# Patient Record
Sex: MALE | Race: WHITE | NOT HISPANIC OR LATINO
[De-identification: names, ages, dates, MRNs, and addresses within clinical notes are randomized per-mention and may not be internally consistent; named-entity substitution may affect disease eponyms.]

---

## 2017-08-17 PROBLEM — Z00.00 ENCOUNTER FOR PREVENTIVE HEALTH EXAMINATION: Status: ACTIVE | Noted: 2017-08-17

## 2017-10-12 ENCOUNTER — APPOINTMENT (OUTPATIENT)
Dept: ENDOCRINOLOGY | Facility: CLINIC | Age: 82
End: 2017-10-12
Payer: MEDICARE

## 2017-10-12 VITALS
WEIGHT: 157.56 LBS | BODY MASS INDEX: 27.92 KG/M2 | SYSTOLIC BLOOD PRESSURE: 145 MMHG | DIASTOLIC BLOOD PRESSURE: 71 MMHG | HEIGHT: 63 IN | OXYGEN SATURATION: 98 % | TEMPERATURE: 98.1 F | HEART RATE: 75 BPM

## 2017-10-12 DIAGNOSIS — G40.909 EPILEPSY, UNSPECIFIED, NOT INTRACTABLE, W/OUT STATUS EPILEPTICUS: ICD-10-CM

## 2017-10-12 DIAGNOSIS — G89.29 OTHER CHRONIC PAIN: ICD-10-CM

## 2017-10-12 DIAGNOSIS — Z86.79 PERSONAL HISTORY OF OTHER DISEASES OF THE CIRCULATORY SYSTEM: ICD-10-CM

## 2017-10-12 DIAGNOSIS — N40.0 BENIGN PROSTATIC HYPERPLASIA WITHOUT LOWER URINARY TRACT SYMPMS: ICD-10-CM

## 2017-10-12 DIAGNOSIS — E55.9 VITAMIN D DEFICIENCY, UNSPECIFIED: ICD-10-CM

## 2017-10-12 DIAGNOSIS — Z87.81 PERSONAL HISTORY OF (HEALED) TRAUMATIC FRACTURE: ICD-10-CM

## 2017-10-12 DIAGNOSIS — K21.0 GASTRO-ESOPHAGEAL REFLUX DISEASE WITH ESOPHAGITIS: ICD-10-CM

## 2017-10-12 DIAGNOSIS — R13.10 DYSPHAGIA, UNSPECIFIED: ICD-10-CM

## 2017-10-12 PROCEDURE — 99214 OFFICE O/P EST MOD 30 MIN: CPT

## 2017-10-26 ENCOUNTER — MEDICATION RENEWAL (OUTPATIENT)
Age: 82
End: 2017-10-26

## 2017-11-07 ENCOUNTER — FORM ENCOUNTER (OUTPATIENT)
Age: 82
End: 2017-11-07

## 2017-11-08 ENCOUNTER — APPOINTMENT (OUTPATIENT)
Dept: ULTRASOUND IMAGING | Facility: CLINIC | Age: 82
End: 2017-11-08
Payer: MEDICARE

## 2017-11-08 ENCOUNTER — OUTPATIENT (OUTPATIENT)
Dept: OUTPATIENT SERVICES | Facility: HOSPITAL | Age: 82
LOS: 1 days | End: 2017-11-08

## 2017-11-08 PROCEDURE — 76536 US EXAM OF HEAD AND NECK: CPT | Mod: 26

## 2018-02-12 ENCOUNTER — APPOINTMENT (OUTPATIENT)
Dept: ENDOCRINOLOGY | Facility: CLINIC | Age: 83
End: 2018-02-12

## 2018-03-11 PROBLEM — G40.909 SEIZURE DISORDER: Status: ACTIVE | Noted: 2018-03-11

## 2018-03-11 PROBLEM — Z86.79 HISTORY OF SUBDURAL HEMATOMA: Status: RESOLVED | Noted: 2018-03-11 | Resolved: 2018-03-11

## 2018-03-11 PROBLEM — N40.0 BPH (BENIGN PROSTATIC HYPERPLASIA): Status: ACTIVE | Noted: 2018-03-11

## 2018-03-11 PROBLEM — G89.29 CHRONIC PAIN: Status: ACTIVE | Noted: 2018-03-11

## 2018-03-11 PROBLEM — K21.0 GASTROESOPHAGEAL REFLUX DISEASE WITH ESOPHAGITIS: Status: ACTIVE | Noted: 2018-03-11

## 2018-03-11 PROBLEM — R13.10 DYSPHAGIA: Status: ACTIVE | Noted: 2018-03-11

## 2018-03-11 PROBLEM — E55.9 VITAMIN D DEFICIENCY: Status: ACTIVE | Noted: 2018-03-11

## 2018-03-11 PROBLEM — Z87.81 HISTORY OF FRACTURE OF SKULL: Status: RESOLVED | Noted: 2018-03-11 | Resolved: 2018-03-11

## 2018-03-11 RX ORDER — POLYETHYLENE GLYCOL 3350 17 G/17G
17 POWDER, FOR SOLUTION ORAL DAILY
Refills: 0 | Status: ACTIVE | COMMUNITY
Start: 2018-03-11

## 2018-03-11 RX ORDER — PANTOPRAZOLE 40 MG/1
40 TABLET, DELAYED RELEASE ORAL DAILY
Refills: 0 | Status: ACTIVE | COMMUNITY
Start: 2018-03-11

## 2018-03-11 RX ORDER — ASPIRIN 81 MG/1
81 TABLET ORAL
Refills: 0 | Status: ACTIVE | COMMUNITY
Start: 2018-03-11

## 2018-03-11 RX ORDER — FENTANYL 25 UG/H
25 PATCH, EXTENDED RELEASE TRANSDERMAL
Refills: 0 | Status: ACTIVE | COMMUNITY
Start: 2018-03-11

## 2018-03-11 RX ORDER — SILODOSIN 8 MG/1
8 CAPSULE ORAL DAILY
Refills: 0 | Status: ACTIVE | COMMUNITY
Start: 2018-03-11

## 2018-03-11 RX ORDER — GUARN/MA-HUANG/P.GIN/S.GINSENG
600-200 TABLET ORAL
Refills: 0 | Status: ACTIVE | COMMUNITY
Start: 2018-03-11

## 2018-03-11 RX ORDER — CARBAMAZEPINE 400 MG/1
400 TABLET, EXTENDED RELEASE ORAL
Refills: 0 | Status: ACTIVE | COMMUNITY
Start: 2018-03-11

## 2018-03-12 ENCOUNTER — LABORATORY RESULT (OUTPATIENT)
Age: 83
End: 2018-03-12

## 2018-03-12 ENCOUNTER — APPOINTMENT (OUTPATIENT)
Dept: ENDOCRINOLOGY | Facility: CLINIC | Age: 83
End: 2018-03-12
Payer: MEDICARE

## 2018-03-12 VITALS
WEIGHT: 163 LBS | DIASTOLIC BLOOD PRESSURE: 64 MMHG | BODY MASS INDEX: 27.83 KG/M2 | OXYGEN SATURATION: 99 % | TEMPERATURE: 97.4 F | SYSTOLIC BLOOD PRESSURE: 171 MMHG | HEART RATE: 79 BPM | HEIGHT: 64 IN

## 2018-03-12 DIAGNOSIS — Z82.0 FAMILY HISTORY OF EPILEPSY AND OTHER DISEASES OF THE NERVOUS SYSTEM: ICD-10-CM

## 2018-03-12 PROCEDURE — 99214 OFFICE O/P EST MOD 30 MIN: CPT

## 2018-03-12 RX ORDER — METFORMIN HYDROCHLORIDE 500 MG/1
500 TABLET, COATED ORAL TWICE DAILY
Qty: 60 | Refills: 11 | Status: ACTIVE | COMMUNITY
Start: 2018-03-12

## 2018-03-15 LAB
THYROGLOBULIN TUMOR MARKER, IA INTERP: NORMAL
THYROGLOBULIN TUMOR MARKER, IA: <0.1 NG/ML

## 2018-03-22 ENCOUNTER — MEDICATION RENEWAL (OUTPATIENT)
Age: 83
End: 2018-03-22

## 2018-09-12 ENCOUNTER — APPOINTMENT (OUTPATIENT)
Dept: ENDOCRINOLOGY | Facility: CLINIC | Age: 83
End: 2018-09-12
Payer: MEDICARE

## 2018-09-12 VITALS
HEIGHT: 64 IN | BODY MASS INDEX: 26.8 KG/M2 | SYSTOLIC BLOOD PRESSURE: 147 MMHG | OXYGEN SATURATION: 95 % | DIASTOLIC BLOOD PRESSURE: 73 MMHG | WEIGHT: 157 LBS | TEMPERATURE: 97.9 F | HEART RATE: 68 BPM

## 2018-09-12 DIAGNOSIS — M19.011 PRIMARY OSTEOARTHRITIS, RIGHT SHOULDER: ICD-10-CM

## 2018-09-12 DIAGNOSIS — M19.012 PRIMARY OSTEOARTHRITIS, RIGHT SHOULDER: ICD-10-CM

## 2018-09-12 PROCEDURE — 99214 OFFICE O/P EST MOD 30 MIN: CPT

## 2018-09-12 RX ORDER — ATORVASTATIN CALCIUM 10 MG/1
10 TABLET, FILM COATED ORAL
Qty: 90 | Refills: 3 | Status: DISCONTINUED | COMMUNITY
Start: 2018-03-11 | End: 2018-09-12

## 2018-09-12 RX ORDER — UBIDECARENONE/VIT E ACET 100MG-5
50 MCG CAPSULE ORAL
Refills: 0 | Status: ACTIVE | COMMUNITY
Start: 2018-03-11

## 2018-09-12 RX ORDER — CHLORHEXIDINE GLUCONATE 4 %
1000 LIQUID (ML) TOPICAL
Refills: 0 | Status: ACTIVE | COMMUNITY
Start: 2018-03-11

## 2019-03-13 ENCOUNTER — APPOINTMENT (OUTPATIENT)
Dept: ENDOCRINOLOGY | Facility: CLINIC | Age: 84
End: 2019-03-13
Payer: MEDICARE

## 2019-03-13 VITALS
DIASTOLIC BLOOD PRESSURE: 85 MMHG | HEART RATE: 74 BPM | HEIGHT: 64 IN | SYSTOLIC BLOOD PRESSURE: 164 MMHG | WEIGHT: 165 LBS | OXYGEN SATURATION: 95 % | BODY MASS INDEX: 28.17 KG/M2

## 2019-03-13 PROCEDURE — 99214 OFFICE O/P EST MOD 30 MIN: CPT

## 2019-03-17 NOTE — ASSESSMENT
[FreeTextEntry1] : 1) Type 2 DM:  Glycemic control is excellent by A1c level, and his diet leaves little room for additional modification.  He is not currently monitoring fingerstick blood glucoses, and will not push him to do this until/unless his A1c rises to near 6.5%.\par --Continue metformin 500 mg BID\par \par 2) Hypothyroidism:  TSH level is at the lower limits of the normal range, which is his goal at this point.\par --Continue levothyroxine (125 mcg tablets) 2 pills 5 days/week, 1 pill 2 days/week\par \par 3) Hypercholesterolemia:  LDL-cholesterol is well below his target of 100 mg%.\par --Continue atorvastatin 20 mg/day\par \par 4) Follicular thyroid CA:  Thyroglobulin level remains undetectable.  HIs last ultrasound in 2017 was negative for recurrent disease or pathologic adenopathy.\par --Repeat thyroglobulin and ultrasound prior to his next visit.\par \par 5) Vitamin D deficiency: 25-D level is actually somewhat above target range.\par --Decrease vitamin D supplementation to 2000 units 5 days/week\par \par To see his ophthalmologist Dr. Chou later today to evaluate the conjunctival injection in his R eye.\par \par See for follow-up in 6 months.  CMP, lipids, A1c, TFTs, thyroglobulin level, 25-D (and ultrasound) prior to his next visit [FreeTextEntry2] : Diet

## 2019-03-17 NOTE — HISTORY OF PRESENT ILLNESS
[FreeTextEntry1] : 84-year-old man who is followed for follicular thyroid CA, post-surgical hypothyroidism, type 2 DM and hyperlipidemia.  He is s/p total thyroidectomy and I-131 ablation (150 mCi) in 2010 for a 3.5 cm lesion in the R lobe which showed capsular and perineural invasion on pathology.  There was no local extension or lymph node involvement.  HIs thyroglobulin levels have remained undetectable since treatment, including after Thyrogen stimulation in 2015.  (His total body scan was also negative at that time).  His other medical problems include chronic dysphagia (which dates to the time of his surgery) and a previous skull fracture with a subdural hematoma which required craniotomy/evacuation/repair in 1989.\par \par He now returns after a hiatus of 6 months.  Has not had any significant medical issues in the interim, and has no new physical complaints.\par As usual, came to the visit accompanied by his wife\sarai Was in [place] for 3 weeks in February.\par Medications reviewed--no changes since his last visit.  Taking levothyroxine consistently and at least an hour before breakfast.\par Vitamin D is still 2000 units/day, but he is taking it 7days (not 5 days)/week.\par Diet reviewed:\par --Breakfast is one or two slices of bread with one piece of fruit\par --Lunch is a piece of Italian bread with cheese or Italian peppers\par --Protein at supper is lamb once or twice or week, steak once a week, veal chop once a week and fish once a week.  Starch is usually pasta--"not too much."\par --Intake of baked goods is minimal.  Has ice cream once a week.

## 2019-03-17 NOTE — REVIEW OF SYSTEMS
[Hearing Loss] : hearing loss [Hair Loss] : hair loss [Difficulty Walking] : difficulty walking [Cold Intolerance] : cold intolerant [Fatigue] : no fatigue [Decreased Appetite] : appetite not decreased [Fever] : no fever [Chills] : no chills [Dry Eyes] : no dryness of the eyes [Eyes Itch] : no itching of the eyes [Chest Pain] : no chest pain [Palpitations] : no palpitations [Lower Ext Edema] : no lower extremity edema [Shortness Of Breath] : no shortness of breath [Wheezing] : no wheezing was heard [Nausea] : no nausea [Constipation] : no constipation [Diarrhea] : no diarrhea [Heartburn] : no heartburn [Polyuria] : no polyuria [Dysuria] : no dysuria [Muscle Cramps] : no muscle cramps [Myalgia] : no myalgia  [Dry Skin] : no dry skin [Headache] : no headaches [Tremors] : no tremors [Pain/Numbness of Digits] : no pain/numbness of digits [Depression] : no depression [Anxiety] : no anxiety [Insomnia] : no insomnia [Stress] : no stress [Polydipsia] : no polydipsia [Heat Intolerance] : heat tolerant [Easy Bleeding] : no ~M tendency for easy bleeding [Easy Bruising] : no tendency for easy bruising [FreeTextEntry2] : Gained 8 lb since his last visit [FreeTextEntry3] : Visual acuity in the R eye still impaired [FreeTextEntry4] : No definite dysphagia at present (except if he tries to take too many pills at one time) [FreeTextEntry6] : Moderate KNAPP.  Occasional non-productive cough [FreeTextEntry7] : Takes Miralax daily to control constipation [FreeTextEntry8] : Nocturia 2-3X/night [FreeTextEntry9] : Pain in both knees and shoulders.  (Would ordinarily be a candidate for shoulder replacement, but says "I'm too old")

## 2019-03-17 NOTE — DATA REVIEWED
[FreeTextEntry1] : [facility]  (311/19)\par \par ,  A1c 5.8%\par CMP WNL\par TSH 0.42 uU/ml  (0.39-4.08 uU/ml)\par Thyroglobulin level < 0.2 ng/ml\par LDL 86, HDL 53, \par 25-D 56.3 ng/ml

## 2019-03-17 NOTE — PHYSICAL EXAM
[Alert] : alert [Well Nourished] : well nourished [Healthy Appearance] : healthy appearance [PERRL] : pupils equal, round and reactive to light [EOMI] : extra ocular movement intact [No Proptosis] : no proptosis [No Lid Lag] : no lid lag [Normal Outer Ear/Nose] : the ears and nose were normal in appearance [No Neck Mass] : no neck mass was observed [No LAD] : no lymphadenopathy [Clear to Auscultation] : lungs were clear to auscultation bilaterally [Normal Rate] : heart rate was normal  [Regular Rhythm] : with a regular rhythm [Carotids Normal] : carotid pulses were normal with no bruits [No Edema] : there was no peripheral edema [Not Tender] : non-tender [Soft] : abdomen soft [No HSM] : no hepato-splenomegaly [No CVA Tenderness] : no ~M costovertebral angle tenderness [No Joint Swelling] : no joint swelling seen [No Involuntary Movements] : no involuntary movements were seen [No Rash] : no rash [No Tremors] : no tremors [Normal Sensation on Monofilament Testing] : normal sensation on monofilament testing of lower extremities [Normal Affect] : the affect was normal [Normal Mood] : the mood was normal [Gynecomastia] : no gynecomastia [Kyphosis] : no kyphosis present [Foot Ulcers] : no foot ulcers [Acanthosis Nigricans] : no acanthosis nigricans [de-identified] : Moderate conjunctival injection OD [de-identified] : Hearing is mildly impaired [de-identified] : Thyroidectomy scar.  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  LOM both shoulders [de-identified] : Significant male-pattern hair loss [de-identified] : Vibratory sensation absent over the toes, nearly absent over the malleoli

## 2019-06-04 ENCOUNTER — MEDICATION RENEWAL (OUTPATIENT)
Age: 84
End: 2019-06-04

## 2019-08-12 ENCOUNTER — FORM ENCOUNTER (OUTPATIENT)
Age: 84
End: 2019-08-12

## 2019-08-13 ENCOUNTER — APPOINTMENT (OUTPATIENT)
Dept: ULTRASOUND IMAGING | Facility: CLINIC | Age: 84
End: 2019-08-13
Payer: MEDICARE

## 2019-08-13 ENCOUNTER — OUTPATIENT (OUTPATIENT)
Dept: OUTPATIENT SERVICES | Facility: HOSPITAL | Age: 84
LOS: 1 days | End: 2019-08-13

## 2019-08-13 PROCEDURE — 76536 US EXAM OF HEAD AND NECK: CPT | Mod: 26

## 2019-09-16 ENCOUNTER — APPOINTMENT (OUTPATIENT)
Dept: ENDOCRINOLOGY | Facility: CLINIC | Age: 84
End: 2019-09-16
Payer: MEDICARE

## 2019-09-16 VITALS
BODY MASS INDEX: 27.31 KG/M2 | OXYGEN SATURATION: 97 % | HEIGHT: 64 IN | DIASTOLIC BLOOD PRESSURE: 67 MMHG | SYSTOLIC BLOOD PRESSURE: 149 MMHG | WEIGHT: 160 LBS | HEART RATE: 75 BPM

## 2019-09-16 PROCEDURE — 99214 OFFICE O/P EST MOD 30 MIN: CPT

## 2019-09-17 NOTE — DATA REVIEWED
[FreeTextEntry1] : [facility]  (8/14/19)\par \par ,  A1c 6.2%\par CMP WNL\par TSH 0.47 uU/ml  (0.39-4.08)\par LDL 91, HDL 68, TG 87\par Hb 12.1 gm, , CBC otherwise normal\par Thyroglobulin level < 0.2 ng/ml  (antibodies neg)\par 25-D level 64 ng/ml\par \par Thyroid Ultrasound ([facility], 8/13/19)\par \par No evidence of recurrent disease in the thyroid bed\par Limited visualization of a left level 4 node which is possibly abnormal (loss of fatty hilum)\par

## 2019-09-17 NOTE — REVIEW OF SYSTEMS
[Decreased Appetite] : appetite not decreased [Fatigue] : no fatigue [Chills] : no chills [Fever] : no fever [Blurry Vision] : no blurred vision [Dry Eyes] : no dryness of the eyes [Eyes Itch] : no itching of the eyes [Dysphagia] : no dysphagia [Hearing Loss] : no hearing loss  [Chest Pain] : no chest pain [Palpitations] : no palpitations [Lower Ext Edema] : no lower extremity edema [Shortness Of Breath] : no shortness of breath [Wheezing] : no wheezing was heard [Nausea] : no nausea [SOB on Exertion] : no shortness of breath during exertion [Diarrhea] : no diarrhea [Polyuria] : no polyuria [Muscle Cramps] : no muscle cramps [Dysuria] : no dysuria [Dry Skin] : no dry skin [Hair Loss] : no hair loss [Tremors] : no tremors [Headache] : no headaches [Depression] : no depression [Pain/Numbness of Digits] : no pain/numbness of digits [Anxiety] : no anxiety [Insomnia] : no insomnia [Stress] : no stress [Polydipsia] : no polydipsia [Heat Intolerance] : heat tolerant [Cold Intolerance] : cold tolerant [Easy Bleeding] : no ~M tendency for easy bleeding [Easy Bruising] : no tendency for easy bruising [FreeTextEntry2] : Has lost 5 lb since his last visit [FreeTextEntry4] : Moderate neck discomfort and chronic stiffness.  No actual dysphagia but often coughs after drinking liquids [FreeTextEntry6] : Intermittent mildly productive cough.  Also tends to cough after drinking liquids as noted above [FreeTextEntry7] : Reflux symptoms under adequate control with pantoprazole, and constipation is being treated with daily Miralax [FreeTextEntry8] : Nocturia 2-3X/night [FreeTextEntry9] : Complains that "everything hurts" [de-identified] : Says that his "legs feel weak" after walking one block

## 2019-09-17 NOTE — HISTORY OF PRESENT ILLNESS
[FreeTextEntry1] : 85-year-old man who is followed for follicular thyroid CA, post-surgical hypothyroidism, type 2 DM and hyperlipidemia.  He is s/p total thyroidectomy and I-131 ablation (150 mCi) in 2010 for a 3.5 cm lesion in the R lobe which showed capsular and perineural invasion on pathology.  There was no local extension or lymph node involvement.  HIs thyroglobulin levels have remained undetectable since treatment, including after Thyrogen stimulation in 2015.  (His total body scan was also negative at that time).  His other medical problems include chronic dysphagia (which dates to the time of his surgery) and a previous skull fracture with a subdural hematoma which required craniotomy/evacuation/repair in 1989. \par \sarai As usual, came to the visit accompanied by his wife.  \sarai Fell in June (lost his balance while putting on his shoes), and developed neck pain afterward.  Saw an orthopedist and had X-rays which were negative.  He then saw his pulmonologist because of increasing SOB, and had an MRI which apparently showed both PNA and a fracture of one of the cervical vertebrae.  Was hospitalized for the PNA at [facility], and wore a cervical collar for one month for the spinal fracture.  Still has mild dyspnea, and significant LOM of his neck.\par Still coughing productively, and also still not sleeping with his head elevated.\par Diet is about the same:\par --Breakfast is Italian bread and a pear.\par --Lunch is fried peppers with more bread.\par --Does not snack in the afternoon\par --Protein at supper is veal, lamb chop or chicken.  Starch is usually pasta ("a dish")\par --Has fruit after supper--cherries, sometimes grapes\par Taking the levothyroxine consistently and correctly.  \par Saw his PCP--no changes in his other medications were made

## 2019-09-17 NOTE — ASSESSMENT
[FreeTextEntry1] : 1) Post-surgical hypothyroidism:  Given the pt's age and long disease-free interval, the risks of TSH suppression would likely outweigh any potential benefits.  Would therefore regard his target as the lower part of the normal range--ideally < 1.0 uU/ml.  He is therefore at goal on his current regimen.\par --Continue levothyroxine 250 mcg 5 days/week, 125 mcg 2 days/week\par \par 2) Follicular thyroid CA:  Recent ultrasound shows no evidence of recurrent disease in the thyroid bed, but the report mentions a sub-optimally visualized left cervical node which is possibly abnormal.  Although an MRI or CT could be considered, my index of suspicion for this node indicating metastatic disease is low--it was not optimally visualized on the ultrasound, the pt's thyroglobulin level remains undetectable, and his original lesion was in the right lobe.\par --Will therefore repeat his ultrasound in January (before he leaves for [place] in Feb), and obtain a needle biopsy of the node if it is again visualized. \par \par 3) Type 2 DM:  Glycemic control is excellent by A1c level, and FBS is only slightly above target range.\par --Continue metformin 500 mg BID\par --Diet reinforced--(still needs to watch fruit intake)\par --Hold off on fingerstick monitoring\par \par 4) Hypercholesterolemia:  LDL-cholesterol is below his goal of 100 mg%\par --Continue atorvastatin 20 mg/day\par \par See for follow-up in March.  CMP, lipids, A1c, 25-D, TFTs, thyroglobulin level before the visit\par Thyroid ultrasound in January [FreeTextEntry2] : Diet

## 2019-09-17 NOTE — PHYSICAL EXAM
[Alert] : alert [No Acute Distress] : no acute distress [Normal Sclera/Conjunctiva] : normal sclera/conjunctiva [EOMI] : extra ocular movement intact [No Proptosis] : no proptosis [No Lid Lag] : no lid lag [Normal Outer Ear/Nose] : the ears and nose were normal in appearance [Normal Hearing] : hearing was normal [No Neck Mass] : no neck mass was observed [No LAD] : no lymphadenopathy [Clear to Auscultation] : lungs were clear to auscultation bilaterally [Normal Rate] : heart rate was normal  [Regular Rhythm] : with a regular rhythm [Carotids Normal] : carotid pulses were normal with no bruits [No Edema] : there was no peripheral edema [Not Tender] : non-tender [Soft] : abdomen soft [No HSM] : no hepato-splenomegaly [Normal] : normal and non tender [No CVA Tenderness] : no ~M costovertebral angle tenderness [No Joint Swelling] : no joint swelling seen [No Involuntary Movements] : no involuntary movements were seen [No Rash] : no rash [No Tremors] : no tremors [Normal Sensation on Monofilament Testing] : normal sensation on monofilament testing of lower extremities [Normal Affect] : the affect was normal [Normal Mood] : the mood was normal [Kyphosis] : no kyphosis present [Gynecomastia] : no gynecomastia [Acanthosis Nigricans] : no acanthosis nigricans [Foot Ulcers] : no foot ulcers [de-identified] : Moderately overweight [de-identified] : No murmurs [de-identified] : Thyroidectomy scar.  No palpable thyroid tissue.  Markedly limited neck mobility in all directions [de-identified] : Pupils miotic.  No corneal arcus [de-identified] : DP pulses 2+ on the right, only faintly palpable on the left [de-identified] : No cervical or supraclavicular adenopathy [de-identified] : Vibratory sensation absent over the toes, nearly absent over the malleoli

## 2019-09-26 ENCOUNTER — NON-APPOINTMENT (OUTPATIENT)
Age: 84
End: 2019-09-26

## 2019-09-26 ENCOUNTER — APPOINTMENT (OUTPATIENT)
Dept: OPHTHALMOLOGY | Facility: CLINIC | Age: 84
End: 2019-09-26
Payer: MEDICARE

## 2019-09-26 PROCEDURE — 92012 INTRM OPH EXAM EST PATIENT: CPT

## 2019-09-26 PROCEDURE — 92002 INTRM OPH EXAM NEW PATIENT: CPT

## 2019-10-29 ENCOUNTER — APPOINTMENT (OUTPATIENT)
Dept: OPHTHALMOLOGY | Facility: CLINIC | Age: 84
End: 2019-10-29

## 2019-10-29 ENCOUNTER — OUTPATIENT (OUTPATIENT)
Dept: OUTPATIENT SERVICES | Facility: HOSPITAL | Age: 84
LOS: 1 days | End: 2019-10-29

## 2019-10-29 ENCOUNTER — APPOINTMENT (OUTPATIENT)
Dept: OPHTHALMOLOGY | Facility: CLINIC | Age: 84
End: 2019-10-29
Payer: MEDICARE

## 2019-10-29 ENCOUNTER — NON-APPOINTMENT (OUTPATIENT)
Age: 84
End: 2019-10-29

## 2019-10-29 PROCEDURE — 66821 AFTER CATARACT LASER SURGERY: CPT | Mod: RT

## 2019-10-30 DIAGNOSIS — H26.491 OTHER SECONDARY CATARACT, RIGHT EYE: ICD-10-CM

## 2019-11-18 ENCOUNTER — MEDICATION RENEWAL (OUTPATIENT)
Age: 84
End: 2019-11-18

## 2019-11-19 ENCOUNTER — MEDICATION RENEWAL (OUTPATIENT)
Age: 84
End: 2019-11-19

## 2020-01-13 ENCOUNTER — FORM ENCOUNTER (OUTPATIENT)
Age: 85
End: 2020-01-13

## 2020-01-14 ENCOUNTER — APPOINTMENT (OUTPATIENT)
Dept: ULTRASOUND IMAGING | Facility: CLINIC | Age: 85
End: 2020-01-14
Payer: MEDICARE

## 2020-01-14 ENCOUNTER — OUTPATIENT (OUTPATIENT)
Dept: OUTPATIENT SERVICES | Facility: HOSPITAL | Age: 85
LOS: 1 days | End: 2020-01-14

## 2020-01-14 PROCEDURE — 76536 US EXAM OF HEAD AND NECK: CPT | Mod: 26

## 2020-01-30 ENCOUNTER — APPOINTMENT (OUTPATIENT)
Dept: OPHTHALMOLOGY | Facility: CLINIC | Age: 85
End: 2020-01-30
Payer: MEDICARE

## 2020-01-30 ENCOUNTER — NON-APPOINTMENT (OUTPATIENT)
Age: 85
End: 2020-01-30

## 2020-01-30 PROCEDURE — 92012 INTRM OPH EXAM EST PATIENT: CPT

## 2020-03-16 ENCOUNTER — APPOINTMENT (OUTPATIENT)
Dept: ENDOCRINOLOGY | Facility: CLINIC | Age: 85
End: 2020-03-16

## 2020-06-29 ENCOUNTER — APPOINTMENT (OUTPATIENT)
Dept: ENDOCRINOLOGY | Facility: CLINIC | Age: 85
End: 2020-06-29
Payer: MEDICARE

## 2020-06-29 VITALS
RESPIRATION RATE: 16 BRPM | OXYGEN SATURATION: 96 % | HEART RATE: 77 BPM | BODY MASS INDEX: 28 KG/M2 | DIASTOLIC BLOOD PRESSURE: 71 MMHG | WEIGHT: 164 LBS | HEIGHT: 64 IN | SYSTOLIC BLOOD PRESSURE: 154 MMHG | TEMPERATURE: 98.4 F

## 2020-06-29 DIAGNOSIS — H91.90 UNSPECIFIED HEARING LOSS, UNSPECIFIED EAR: ICD-10-CM

## 2020-06-29 DIAGNOSIS — Z87.81 PERSONAL HISTORY OF (HEALED) TRAUMATIC FRACTURE: ICD-10-CM

## 2020-06-29 PROCEDURE — 99214 OFFICE O/P EST MOD 30 MIN: CPT

## 2020-06-29 NOTE — REASON FOR VISIT
[Follow - Up] : a follow-up visit [DM Type 2] : DM Type 2 [Hypothyroidism] : hypothyroidism [Thyroid Cancer] : thyroid cancer

## 2020-07-04 PROBLEM — H91.90 HEARING LOSS: Status: ACTIVE | Noted: 2020-07-04

## 2020-07-04 PROBLEM — Z87.81 HISTORY OF CERVICAL FRACTURE: Status: RESOLVED | Noted: 2020-07-04 | Resolved: 2020-07-04

## 2020-07-06 NOTE — ADDENDUM
[FreeTextEntry1] : Spoke with the pt's wife on 7/6/20 regarding the results of the lab tests drawn on the day of his visit:\par \par Glycemic control was better than expected--Glucose was 115,  A1c level 6.4%.  Will therefore not start another oral agent or push him to begin fingerstick monitoring.  Discussed diet once again with his wife, emphasizing the need to limit his fruit intake at breakfast and after supper.\par \par TSH level was again suppressed--with a similar value (0.16 uU/ml) as in March.\par Will decrease the levothyroxine to 2 tablets (250 mcg) 4 days/week, 1 tablet (125 mcg) Tues/Fri/Sat\par \par Thyroglobulin level was again undetectable at < 0.5 ng/ml\par

## 2020-07-06 NOTE — DATA REVIEWED
[FreeTextEntry1] : [facility] (drawn at [facility]) 3/7/20\par \par ,  A1c 6.8%\par CMP WNL\par LDL 87, HDL 53, TG 82\par TSH 0.15 uU/ml  (0.39-4.08)\par Urine protein/creat ratio 0.07 (normal < 0.1)\par Thyroglobulin level barely detectable at 0.21 ng/ml\par 25-D level excellent at 50.8 ng/ml\par \par \par Thyroid Ultrasound ([facility]--1/14/20)\par \par No evidence of recurrent tumor in the thyroid bed\par No change in the 1.1 cm left level 4 cervical lymph node (?? abnormal due to absence of a fatty hilum)\par

## 2020-07-06 NOTE — PHYSICAL EXAM
[Alert] : alert [Normal Sclera/Conjunctiva] : normal sclera/conjunctiva [No Acute Distress] : no acute distress [Well Nourished] : well nourished [EOMI] : extra ocular movement intact [PERRL] : pupils equal, round and reactive to light [No Proptosis] : no proptosis [No Neck Mass] : no neck mass was observed [No LAD] : no lymphadenopathy [No Lid Lag] : no lid lag [Normal Outer Ear/Nose] : the ears and nose were normal in appearance [No Respiratory Distress] : no respiratory distress [Normal Rate] : heart rate was normal [Regular Rhythm] : with a regular rhythm [No Edema] : no peripheral edema [Carotids Normal] : carotid pulses were normal with no bruits [Normal Supraclavicular Nodes] : no supraclavicular lymphadenopathy [Not Tender] : non-tender [No Involuntary Movements] : no involuntary movements were seen [No CVA Tenderness] : no ~M costovertebral angle tenderness [Normal Anterior Cervical Nodes] : no anterior cervical lymphadenopathy [No Joint Swelling] : no joint swelling seen [No Tremors] : no tremors [Normal Sensation on Monofilament Testing] : normal sensation on monofilament testing of lower extremities [Normal Affect] : the affect was normal [Normal Mood] : the mood was normal [Acanthosis Nigricans] : no acanthosis nigricans [Foot Ulcers] : no foot ulcers [de-identified] : Moderate corneal arcus, more prominent on the right [de-identified] : Thyroidectomy scar.  No palpable thyroid tissue [de-identified] : Coarse rhonchi (mostly expiratory) bilaterally [de-identified] : Hearing is clearly impaired [de-identified] : Muscle tension precludes optimal exam [de-identified] : DP pulses 3+ bilaterally [de-identified] : No murmurs [de-identified] : Mild dorsal kyphosis [de-identified] : Vibratory sensation absent over the toes and malleoli [de-identified] : Walks slowly, stooped over.  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

## 2020-07-06 NOTE — REVIEW OF SYSTEMS
[SOB on Exertion] : shortness of breath on exertion [Constipation] : constipation [Back Pain] : back pain [Difficulty Walking] : difficulty walking [Dysphagia] : dysphagia [Fatigue] : no fatigue [Decreased Appetite] : appetite not decreased [Fever] : no fever [Dry Eyes] : no dryness [Chills] : no chills [Blurred Vision] : no blurred vision [Chest Pain] : no chest pain [Nasal Congestion] : no nasal congestion [Eyes Itch] : no itch [Palpitations] : no palpitations [Lower Ext Edema] : no lower extremity edema [Wheezing] : no wheezing [Shortness Of Breath] : no shortness of breath [Nausea] : no nausea [Heartburn] : no heartburn [Diarrhea] : no diarrhea [Polyuria] : no polyuria [Joint Pain] : no joint pain [Dysuria] : no dysuria [Muscle Weakness] : no muscle weakness [Dry Skin] : no dry skin [Headaches] : no headaches [Ulcer] : no ulcer [Tremors] : no tremors [Pain/Numbness of Digits] : no pain/numbness of digits [Stress] : no stress [Depression] : no depression [Anxiety] : no anxiety [Polydipsia] : no polydipsia [Cold Intolerance] : no cold intolerance [Easy Bruising] : no tendency for easy bruising [Easy Bleeding] : no ~M tendency for easy bleeding [Heat Intolerance] : no heat intolerance [FreeTextEntry2] : Has gained 4 lb since his last visit [FreeTextEntry8] : Nocturia 2-3X/night [FreeTextEntry6] : Cough after eating is non-productive [FreeTextEntry7] : Reflux symptoms are under control with daily pantoprazole.  Takes Miralax daily for constipation [FreeTextEntry4] : Denies his obvious hearing impairment [FreeTextEntry9] : Limited neck mobility as noted in the HPI.  Complains that his legs get "tight" after walking 1-2 blocks.

## 2020-07-06 NOTE — ASSESSMENT
[FreeTextEntry2] : Diet [FreeTextEntry1] : 1) Type 2 DM:  A1c level in March was still below the target of 7%, but was higher than all of his previous values.  Suspect that most of his hyperglycemia is occurring after breakfast (where his fruit intake is excessive), and after supper (from excessive portions of pasta at the meal plus his snacking on fruit after the meal).\par --Will repeat his A1c level today\par --Diet was discussed with the pt and his wife.  Emphasized the need to limit to one fruit serving at breakfast (including limiting to only half a banana), and also limiting his fruit intake after supper.  He likely needs to limit his portions of pasta, but this may not be "negotiable" at this point.\par --Continue monotherapy with metformin for now, but may need to add a secretagogue \par \par 2) Hypothyroidism:  Despite his history of thyroid CA, will not aim for a suppressed TSH level given his advanced age and long disease-free interval.  His TSH level was in the suppressed range on the March bloodwork, but will repeat this today before decreasing his levothyroxine dose.  \par --Continue levothyroxine 250 mcg 5 days/week, 125 mcg Fri and Sat pending the results of today's labs\par \par 3) Hypercholesterolemia:  LDL-cholesterol is below his target of 100 mg%, but will repeat a lipid profile today.\par --Continue atorvastatin 20 mg/day\par \par 4) Follicular thyroid CA:  Thyroglobulin level is barely detectable.  HIs ultrasound was negative for recurrent disease in the thyroid bed.  The level 4 cervical node is stable in size and appearance, is not clearly abnormal, and is unlikely to be a metastatic focus given the undetectable thyroglobulin level, the tendency of follicular CA not to metastasize to nodes, and the fact that his tumor was in the R lobe\par \par 5) Hearing loss:  Appears to be quite significant.\par --Emphasized to the pt and his wife the risks of accelerated cognitive decline with untreated hearing loss\par --Suggested that she seek a referral at [facility] when the see Dr. Chou for his upcoming eye exam..\par \par Bloodwork to be drawn today (see below)\par Pt's wife is to call back next week to discuss the results.\par See for follow-up in 4 months.  CMP, lipids, A1c, TFTs, thyroglobulin level before the visit.

## 2020-07-06 NOTE — HISTORY OF PRESENT ILLNESS
[FreeTextEntry1] : 86-year-old man who is followed for follicular thyroid CA, post-surgical hypothyroidism, type 2 DM and hyperlipidemia.  He is s/p total thyroidectomy and I-131 ablation (150 mCi) in 2010 for a 3.5 cm lesion in the R lobe which showed capsular and perineural invasion on pathology.  There was no local extension or lymph node involvement.  HIs thyroglobulin levels have remained undetectable since treatment, including after Thyrogen stimulation in 2015.  (His total body scan was also negative at that time).  His other medical problems include chronic dysphagia (which dates to the time of his surgery) and a previous skull fracture with a subdural hematoma which required craniotomy/evacuation/repair in 1989. \par \par He now returns after a hiatus of 9 months.  As usual, came to the visit accompanied by his wife.\sarai Has not had any significant medical issues in the interim, and has no new physical complaints.  Still has neck pain, but has not had any further imaging of the C-spine, and has not been wearing the cervical collar.  Has significant limitation of motion of his neck. \sarai Has not had any additional falls.\sarai Saw his PCP before the COVID lockdown. BP was apparently OK.  No changes in medication were made.\par Thyroid ultrasound in January showed the left level 4 cervical node (1 cm, but without a fatty hilum) to be stable.  There was no evidence of recurrent disease in the thyroid bed.\par Diet is about the same:\par --Breakfast is a croissant with a pear and half an orange.  (Will sometimes have both a banana and a pear)\par --Lunch is bread with vegetables, cheese or an omelet\par --Protein at supper is fish once a week, steak once a week, veal chop 1-2X/week, otherwise lamb.  Starch is pasta most nights, but sometimes he just has protein and vegetables.\par --Snacks on cherries after supper\sarai Still coughing frequently after eating.  Has not been sleeping with his head elevated at night.

## 2020-07-13 LAB
ALBUMIN SERPL ELPH-MCNC: 4.9 G/DL
ALP BLD-CCNC: 92 U/L
ALT SERPL-CCNC: 12 U/L
ANION GAP SERPL CALC-SCNC: 14 MMOL/L
AST SERPL-CCNC: 18 U/L
BILIRUB SERPL-MCNC: 0.2 MG/DL
BUN SERPL-MCNC: 21 MG/DL
CALCIUM SERPL-MCNC: 9.5 MG/DL
CHLORIDE SERPL-SCNC: 98 MMOL/L
CLINICAL BIOCHEMIST REVIEW: NORMAL
CO2 SERPL-SCNC: 28 MMOL/L
CREAT SERPL-MCNC: 0.72 MG/DL
ESTIMATED AVERAGE GLUCOSE: 137 MG/DL
GLUCOSE SERPL-MCNC: 115 MG/DL
HBA1C MFR BLD HPLC: 6.4 %
POTASSIUM SERPL-SCNC: 5.2 MMOL/L
PROT SERPL-MCNC: 7 G/DL
SODIUM SERPL-SCNC: 141 MMOL/L
T4 FREE SERPL-MCNC: 1.6 NG/DL
THYROGLOB SERPL-MCNC: <0.5 NG/ML
TSH SERPL-ACNC: 0.16 UIU/ML

## 2020-08-19 ENCOUNTER — APPOINTMENT (OUTPATIENT)
Dept: OPHTHALMOLOGY | Facility: CLINIC | Age: 85
End: 2020-08-19

## 2020-10-01 ENCOUNTER — OUTPATIENT (OUTPATIENT)
Dept: OUTPATIENT SERVICES | Facility: HOSPITAL | Age: 85
LOS: 1 days | End: 2020-10-01

## 2020-10-01 ENCOUNTER — RESULT REVIEW (OUTPATIENT)
Age: 85
End: 2020-10-01

## 2020-10-01 ENCOUNTER — APPOINTMENT (OUTPATIENT)
Dept: ULTRASOUND IMAGING | Facility: CLINIC | Age: 85
End: 2020-10-01
Payer: MEDICARE

## 2020-10-01 PROCEDURE — 76536 US EXAM OF HEAD AND NECK: CPT | Mod: 26

## 2020-10-08 ENCOUNTER — APPOINTMENT (OUTPATIENT)
Dept: ENDOCRINOLOGY | Facility: CLINIC | Age: 85
End: 2020-10-08
Payer: MEDICARE

## 2020-10-08 VITALS
OXYGEN SATURATION: 97 % | HEIGHT: 64 IN | HEART RATE: 82 BPM | DIASTOLIC BLOOD PRESSURE: 52 MMHG | SYSTOLIC BLOOD PRESSURE: 152 MMHG | TEMPERATURE: 98 F | WEIGHT: 162 LBS | BODY MASS INDEX: 27.66 KG/M2

## 2020-10-08 PROCEDURE — 99214 OFFICE O/P EST MOD 30 MIN: CPT

## 2020-10-12 NOTE — HISTORY OF PRESENT ILLNESS
[FreeTextEntry1] : 86-year-old man who is followed for follicular thyroid CA, post-surgical hypothyroidism, type 2 DM and hyperlipidemia.  He is s/p total thyroidectomy and I-131 ablation (150 mCi) in 2010 for a 3.5 cm lesion in the R lobe which showed capsular and perineural invasion on pathology.  There was no local extension or lymph node involvement.  HIs thyroglobulin levels have remained undetectable since treatment, including after Thyrogen stimulation in 2015.  (His total body scan was also negative at that time).  His other medical problems include chronic dysphagia (which dates to the time of his surgery) and a previous skull fracture with a subdural hematoma which required craniotomy/evacuation/repair in 1989. \par \sarai As usual, came to the visit accompanied by his wife.\par Fell off a ladder while picking figs off a tree in their garden last month--fractured his coccyx.  Is still in pain, wife is giving him Advil 3X/day.  Did not hit his head or lose consciousness.\par Has not seen his PCP since the beginning of the year.\par Taking levothyroxine consistently and correctly.\par Diet reviewed:\par --Breakfast is a croissant and a pear.  (Substitutes juice for the pear on weekends).\par --Lunch is sauteed peppers with bread\par --Protein at supper is fish once a week, lamb chops once a week, steak once a week, veal (either chop or scallopine) twice a week, chicken 1-2X/week.  Starch is also variable--rice, pasta (2-3X/week), French fries, etc.  Dessert is usually fruit\par --Intake of baked goods is minimal\par --Occasional ice cream\par Sees his pain management physician once a month\par \par \par

## 2020-12-16 ENCOUNTER — APPOINTMENT (OUTPATIENT)
Dept: OPHTHALMOLOGY | Facility: CLINIC | Age: 85
End: 2020-12-16
Payer: MEDICARE

## 2020-12-16 ENCOUNTER — NON-APPOINTMENT (OUTPATIENT)
Age: 85
End: 2020-12-16

## 2020-12-16 PROCEDURE — 92014 COMPRE OPH EXAM EST PT 1/>: CPT

## 2020-12-16 PROCEDURE — 92250 FUNDUS PHOTOGRAPHY W/I&R: CPT

## 2021-04-05 ENCOUNTER — APPOINTMENT (OUTPATIENT)
Dept: ENDOCRINOLOGY | Facility: CLINIC | Age: 86
End: 2021-04-05
Payer: MEDICARE

## 2021-04-05 VITALS
HEIGHT: 64 IN | HEART RATE: 118 BPM | DIASTOLIC BLOOD PRESSURE: 63 MMHG | WEIGHT: 173 LBS | TEMPERATURE: 96.3 F | OXYGEN SATURATION: 95 % | SYSTOLIC BLOOD PRESSURE: 114 MMHG | BODY MASS INDEX: 29.53 KG/M2 | RESPIRATION RATE: 18 BRPM

## 2021-04-05 PROCEDURE — 99214 OFFICE O/P EST MOD 30 MIN: CPT

## 2021-04-13 LAB — TSH SERPL-ACNC: 0.44 UIU/ML

## 2021-04-13 NOTE — REVIEW OF SYSTEMS
[Fatigue] : fatigue [Dysphagia] : dysphagia [Hearing Loss] : hearing loss [Cough] : cough [SOB on Exertion] : shortness of breath on exertion [Stress] : stress [Heat Intolerance] : heat intolerance [Decreased Appetite] : appetite not decreased [Fever] : no fever [Chills] : no chills [Dry Eyes] : no dryness [Blurred Vision] : no blurred vision [Eyes Itch] : no itch [Chest Pain] : no chest pain [Palpitations] : no palpitations [Lower Ext Edema] : no lower extremity edema [Shortness Of Breath] : no shortness of breath [Wheezing] : no wheezing [Nausea] : no nausea [Constipation] : no constipation [Heartburn] : no heartburn [Diarrhea] : no diarrhea [Polyuria] : no polyuria [Dysuria] : no dysuria [Joint Pain] : no joint pain [Muscle Weakness] : no muscle weakness [Myalgia] : no myalgia  [Joint Stiffness] : no joint stiffness [Dry Skin] : no dry skin [Hair Loss] : no hair loss [Ulcer] : no ulcer [Depression] : no depression [Anxiety] : no anxiety [Polydipsia] : no polydipsia [Cold Intolerance] : no cold intolerance [Easy Bleeding] : no ~M tendency for easy bleeding [Easy Bruising] : no tendency for easy bruising [FreeTextEntry2] : Has gained 11 lb since his last visit [FreeTextEntry4] : Says that the hearing in his R ear is severely impaired [FreeTextEntry8] : Nocturia multiple times a night

## 2021-04-13 NOTE — DATA REVIEWED
[FreeTextEntry1] : [facility]  (3/30/21)\par \par ,  A1c 7.0%\par CMP WNL\par LDL 86, HDL 56, TG 89\par Urine microalbumin undetectable\par Free T4 1.31 ng/dl  (0.58-1.64)\par TSH level not done

## 2021-04-13 NOTE — ADDENDUM
[FreeTextEntry1] : Discussed results of labwork done at his visit and assessment at the visit with pt's daughter Ines on 4/13/21:\par \par TSH level was in target range at 0.44 uU/ml--To continue the current levothyroxine regimen\par Discussed the rising A1c level with her (and also with the pt's wife by phone)--need to watch the pt's intake of grapes and portions of pasta.\par He has seen a neurologist about a tremor, was started on Sinemet, but has not helped.  Neurologist unsure whether Parkinsons or essential tremor.\par Will get an FBS and A1c level in 3 months, discuss over the phone, see pt if higher.\par Will need to start FS monitoring at next visit if A1c level above 7%

## 2021-04-13 NOTE — HISTORY OF PRESENT ILLNESS
[FreeTextEntry1] : 86-year-old man who is followed for follicular thyroid CA, post-surgical hypothyroidism, type 2 DM and hyperlipidemia.  He is s/p total thyroidectomy and I-131 ablation (150 mCi) in 2010 for a 3.5 cm lesion in the R lobe which showed capsular and perineural invasion on pathology.  There was no local extension or lymph node involvement.  HIs thyroglobulin levels have remained undetectable since treatment, including after Thyrogen stimulation in 2015.  (His total body scan was also negative at that time).  His other medical problems include chronic dysphagia (which dates to the time of his surgery) and a previous skull fracture with a subdural hematoma which required craniotomy/evacuation/repair in 1989. \par \sarai Came to the visit by himself--wife is at home due to recent knee replacement surgery.\sarai Had one ER visit since his last admission--apparently for a productive cough.  Was told of PNA and given antibiotics.\sarai Still has his usual chronic cough.\sarai Has apparently been given a thickener to add to his thin liquids--says "so the food goes down the right way." \par Diet is similar to before:\sarai --Breakfast is one slice of bread, a pear and coffee\sarai --Seems to be having only vegetables for lunch]\sarai --Protein at supper is fish, chicken or veal.  Starch seems to be pasta most nights, occasionally potato\sarai --Denies any intake of baked goods, but may be having fruit with lunch (often grapes, and more than 10)\sarai Is not monitoring fingersticks at home\par \sarai

## 2021-04-13 NOTE — PHYSICAL EXAM
[Alert] : alert [No Acute Distress] : no acute distress [Normal Sclera/Conjunctiva] : normal sclera/conjunctiva [EOMI] : extra ocular movement intact [No Proptosis] : no proptosis [No Lid Lag] : no lid lag [Normal Outer Ear/Nose] : the ears and nose were normal in appearance [No Neck Mass] : no neck mass was observed [No LAD] : no lymphadenopathy [Normal Rate and Effort] : normal respiratory rate and effort [No Murmurs] : no murmurs [Normal Rate] : heart rate was normal [Regular Rhythm] : with a regular rhythm [Carotids Normal] : carotid pulses were normal with no bruits [No Edema] : no peripheral edema [Not Tender] : non-tender [Soft] : abdomen soft [Normal Supraclavicular Nodes] : no supraclavicular lymphadenopathy [Normal Anterior Cervical Nodes] : no anterior cervical lymphadenopathy [No CVA Tenderness] : no ~M costovertebral angle tenderness [No Involuntary Movements] : no involuntary movements were seen [No Joint Swelling] : no joint swelling seen [Normal Strength/Tone] : muscle strength and tone were normal [No Rash] : no rash [No Tremors] : no tremors [Normal Sensation on Monofilament Testing] : normal sensation on monofilament testing of lower extremities [Normal Affect] : the affect was normal [Normal Mood] : the mood was normal [Kyphosis] : no kyphosis present [Acanthosis Nigricans] : no acanthosis nigricans [Foot Ulcers] : no foot ulcers [de-identified] : Moderately overweight [de-identified] : Pupils miotic.  Moderate corneal arcus [de-identified] : Hearing is definitely impaired [de-identified] : Thyroidectomy scar.  No palpable thyroid tissue [de-identified] : Velcro-type crackles and scattered rhonchi over the left lower lung field [de-identified] : DP pulses 2+ on the right, non-palpable (but with brisk capillary refill) on the left [de-identified] : Liver edge 3 FB below the RCM [de-identified] : Vibratory sensation absent over the toes and malleoli

## 2021-06-02 ENCOUNTER — APPOINTMENT (OUTPATIENT)
Dept: OPHTHALMOLOGY | Facility: CLINIC | Age: 86
End: 2021-06-02
Payer: MEDICARE

## 2021-06-02 ENCOUNTER — NON-APPOINTMENT (OUTPATIENT)
Age: 86
End: 2021-06-02

## 2021-06-02 PROCEDURE — 92012 INTRM OPH EXAM EST PATIENT: CPT

## 2021-11-03 ENCOUNTER — APPOINTMENT (OUTPATIENT)
Dept: OPHTHALMOLOGY | Facility: CLINIC | Age: 86
End: 2021-11-03

## 2021-12-01 ENCOUNTER — APPOINTMENT (OUTPATIENT)
Dept: OPHTHALMOLOGY | Facility: CLINIC | Age: 86
End: 2021-12-01

## 2021-12-29 ENCOUNTER — APPOINTMENT (OUTPATIENT)
Dept: ENDOCRINOLOGY | Facility: CLINIC | Age: 86
End: 2021-12-29
Payer: MEDICARE

## 2021-12-29 VITALS
BODY MASS INDEX: 29.95 KG/M2 | SYSTOLIC BLOOD PRESSURE: 189 MMHG | WEIGHT: 169 LBS | DIASTOLIC BLOOD PRESSURE: 81 MMHG | OXYGEN SATURATION: 97 % | HEIGHT: 63 IN | TEMPERATURE: 97.3 F | HEART RATE: 83 BPM

## 2021-12-29 PROCEDURE — 99214 OFFICE O/P EST MOD 30 MIN: CPT

## 2022-01-01 ENCOUNTER — APPOINTMENT (OUTPATIENT)
Dept: OPHTHALMOLOGY | Facility: CLINIC | Age: 87
End: 2022-01-01

## 2022-01-01 ENCOUNTER — APPOINTMENT (OUTPATIENT)
Dept: NEUROLOGY | Facility: CLINIC | Age: 87
End: 2022-01-01

## 2022-01-01 ENCOUNTER — RESULT REVIEW (OUTPATIENT)
Age: 87
End: 2022-01-01

## 2022-01-01 ENCOUNTER — OUTPATIENT (OUTPATIENT)
Dept: OUTPATIENT SERVICES | Facility: HOSPITAL | Age: 87
LOS: 1 days | End: 2022-01-01

## 2022-01-01 ENCOUNTER — APPOINTMENT (OUTPATIENT)
Dept: ULTRASOUND IMAGING | Facility: CLINIC | Age: 87
End: 2022-01-01

## 2022-01-01 ENCOUNTER — NON-APPOINTMENT (OUTPATIENT)
Age: 87
End: 2022-01-01

## 2022-01-01 ENCOUNTER — APPOINTMENT (OUTPATIENT)
Dept: ENDOCRINOLOGY | Facility: CLINIC | Age: 87
End: 2022-01-01

## 2022-01-01 VITALS
BODY MASS INDEX: 29.07 KG/M2 | WEIGHT: 162 LBS | SYSTOLIC BLOOD PRESSURE: 150 MMHG | DIASTOLIC BLOOD PRESSURE: 65 MMHG | TEMPERATURE: 97.3 F | HEIGHT: 62.5 IN | OXYGEN SATURATION: 97 % | HEART RATE: 87 BPM

## 2022-01-01 VITALS
OXYGEN SATURATION: 96 % | HEART RATE: 77 BPM | BODY MASS INDEX: 29.07 KG/M2 | DIASTOLIC BLOOD PRESSURE: 72 MMHG | WEIGHT: 162 LBS | TEMPERATURE: 97.9 F | HEIGHT: 62.5 IN | SYSTOLIC BLOOD PRESSURE: 167 MMHG

## 2022-01-01 VITALS
DIASTOLIC BLOOD PRESSURE: 70 MMHG | OXYGEN SATURATION: 97 % | HEART RATE: 70 BPM | SYSTOLIC BLOOD PRESSURE: 151 MMHG | RESPIRATION RATE: 16 BRPM | TEMPERATURE: 97.6 F | WEIGHT: 162 LBS | BODY MASS INDEX: 29.07 KG/M2 | HEIGHT: 62.5 IN

## 2022-01-01 VITALS — OXYGEN SATURATION: 94 % | HEART RATE: 87 BPM | DIASTOLIC BLOOD PRESSURE: 76 MMHG | SYSTOLIC BLOOD PRESSURE: 174 MMHG

## 2022-01-01 DIAGNOSIS — R25.1 TREMOR, UNSPECIFIED: ICD-10-CM

## 2022-01-01 DIAGNOSIS — G20 PARKINSON'S DISEASE: ICD-10-CM

## 2022-01-01 LAB
ALBUMIN SERPL ELPH-MCNC: 4.7 G/DL
ALP BLD-CCNC: 123 U/L
ALT SERPL-CCNC: 12 U/L
ANION GAP SERPL CALC-SCNC: 11 MMOL/L
AST SERPL-CCNC: 15 U/L
BILIRUB SERPL-MCNC: 0.3 MG/DL
BUN SERPL-MCNC: 21 MG/DL
CALCIUM SERPL-MCNC: 9.6 MG/DL
CHLORIDE SERPL-SCNC: 101 MMOL/L
CHOLEST SERPL-MCNC: 183 MG/DL
CLINICAL BIOCHEMIST REVIEW: NORMAL
CO2 SERPL-SCNC: 29 MMOL/L
CREAT SERPL-MCNC: 0.79 MG/DL
CREAT SPEC-SCNC: 97 MG/DL
EGFR: 85 ML/MIN/1.73M2
ESTIMATED AVERAGE GLUCOSE: 143 MG/DL
GLUCOSE SERPL-MCNC: 140 MG/DL
HBA1C MFR BLD HPLC: 6.6 %
HDLC SERPL-MCNC: 71 MG/DL
LDLC SERPL CALC-MCNC: 94 MG/DL
MICROALBUMIN 24H UR DL<=1MG/L-MCNC: <1.2 MG/DL
MICROALBUMIN/CREAT 24H UR-RTO: NORMAL MG/G
NONHDLC SERPL-MCNC: 112 MG/DL
POTASSIUM SERPL-SCNC: 5.3 MMOL/L
PROT SERPL-MCNC: 6.8 G/DL
SODIUM SERPL-SCNC: 140 MMOL/L
T4 FREE SERPL-MCNC: 1.5 NG/DL
THYROGLOB AB SERPL-ACNC: <20 IU/ML
THYROGLOB SERPL-MCNC: <0.2 NG/ML
THYROPEROXIDASE AB SERPL IA-ACNC: 10.5 IU/ML
TRIGL SERPL-MCNC: 89 MG/DL
TSH SERPL-ACNC: 0.16 UIU/ML

## 2022-01-01 PROCEDURE — 76536 US EXAM OF HEAD AND NECK: CPT | Mod: 26

## 2022-01-01 PROCEDURE — 99214 OFFICE O/P EST MOD 30 MIN: CPT

## 2022-01-01 PROCEDURE — 92012 INTRM OPH EXAM EST PATIENT: CPT

## 2022-01-01 PROCEDURE — 99205 OFFICE O/P NEW HI 60 MIN: CPT

## 2022-01-01 RX ORDER — VALACYCLOVIR 500 MG/1
500 TABLET, FILM COATED ORAL TWICE DAILY
Refills: 0 | Status: ACTIVE | COMMUNITY
Start: 2020-05-11

## 2022-01-01 RX ORDER — LEVOTHYROXINE SODIUM 0.12 MG/1
125 TABLET ORAL
Qty: 145 | Refills: 3 | Status: ACTIVE | COMMUNITY
Start: 2017-10-26 | End: 1900-01-01

## 2022-01-01 RX ORDER — CARBIDOPA AND LEVODOPA 25; 100 MG/1; MG/1
25-100 TABLET ORAL 4 TIMES DAILY
Qty: 120 | Refills: 11 | Status: ACTIVE | COMMUNITY
Start: 2021-04-13 | End: 1900-01-01

## 2022-01-01 RX ORDER — ATORVASTATIN CALCIUM 20 MG/1
20 TABLET, FILM COATED ORAL
Qty: 90 | Refills: 3 | Status: ACTIVE | COMMUNITY
Start: 2018-09-12 | End: 1900-01-01

## 2022-01-02 NOTE — REVIEW OF SYSTEMS
[Fatigue] : fatigue [Dysphagia] : dysphagia [Cough] : cough [SOB on Exertion] : shortness of breath on exertion [Difficulty Walking] : difficulty walking [Neck Pain] : neck pain [Decreased Appetite] : appetite not decreased [Fever] : no fever [Chills] : no chills [Dry Eyes] : no dryness [Blurred Vision] : no blurred vision [Eyes Itch] : no itch [Chest Pain] : no chest pain [Palpitations] : no palpitations [Lower Ext Edema] : no lower extremity edema [Shortness Of Breath] : no shortness of breath [Wheezing] : no wheezing [Nausea] : no nausea [Constipation] : no constipation [Heartburn] : no heartburn [Diarrhea] : no diarrhea [Polyuria] : no polyuria [Dysuria] : no dysuria [Joint Pain] : no joint pain [Muscle Weakness] : no muscle weakness [Myalgia] : no myalgia  [Joint Stiffness] : no joint stiffness [Dry Skin] : no dry skin [Hair Loss] : no hair loss [Ulcer] : no ulcer [Headaches] : no headaches [Tremors] : no tremors [Pain/Numbness of Digits] : no pain/numbness of digits [Depression] : no depression [Anxiety] : no anxiety [Stress] : no stress [Polydipsia] : no polydipsia [Cold Intolerance] : no cold intolerance [Heat Intolerance] : no heat intolerance [Easy Bleeding] : no ~M tendency for easy bleeding [Easy Bruising] : no tendency for easy bruising [FreeTextEntry2] : Has lost 4 lb since his last visit [FreeTextEntry4] : Had the hearing in his R ear evaluated--apparently had cerumen removed, and was told that he did not need a hearing aid.  Neck stiffness persists. [FreeTextEntry8] : Nocturia multiple times a night.  Daughter says that he often has incontinence during the day

## 2022-01-02 NOTE — ASSESSMENT
[FreeTextEntry1] : 1) Type 2 DM:  Glycemic control is difficult to assess without an updated A1c level, but suspect that his A1c will be near or below 7% based on his current diet history.  HIs carbohydrate intake at breakfast and lunch is modest--the only question is about the portion sizes of pasta at his evening meal (which his daughter implies are likely somewhat excessive).  He appears to have curtailed his previously excessive fruit intake.\par --Diet reinforced\par --Continue metformin\par --Await result of A1c level\par \par 2) Hypothyroidism:  Pt's wife supervises his meds, but he appears to be taking the levothyroxine well before breakfast.  Target TSH level is 0.4-2.5 uU/ml, ideally between 0.4 and 1.0 uU/ml.  \par --Await results of TFTs\par \par 3) Thyroid CA:  Thyroglobulin level is still pending.  His last ultrasound was in October of 2020 and was negative for recurrent disease in the thyroid bed.  The 1 cm left level 4 lymph node which was of concern because of a lack of fatty hilum was unchanged.\par --Await Tg level\par --Needs updated ultrasound before his next visit\par \par 4) Hyperlipidemia:  Diet recall obtained today suggests that his intake of saturated fat is fairly low.  Target LDL-cholesterol is 100 mg% at this point.\par --Continue atorvastatin 20 mg/day pending results of labs\par \par Will contact pt's daughter (Ines) regarding obtaining a copy of his labwork.  (Was done through [facility], but results are not in [employer])\par Will speak to daughter or pt's wife regarding results of the labwork next week\par \par See for follow-up in 6 months.  CMP, lipids, A1c, TFTs, microalb before the visit [FreeTextEntry2] : Diet

## 2022-01-02 NOTE — HISTORY OF PRESENT ILLNESS
[FreeTextEntry1] : 87-year-old man who is followed for follicular thyroid CA, post-surgical hypothyroidism, type 2 DM and hyperlipidemia.  He is s/p total thyroidectomy and I-131 ablation (150 mCi) in 2010 for a 3.5 cm lesion in the R lobe which showed capsular and perineural invasion on pathology.  There was no local extension or lymph node involvement.  HIs thyroglobulin levels have remained undetectable since treatment, including after Thyrogen stimulation in 2015.  (His total body scan was also negative at that time).  His other medical problems include chronic dysphagia (which dates to the time of his surgery) and a previous skull fracture with a subdural hematoma which required craniotomy/evacuation/repair in 1989. \par \sarai Came to the office accompanied by his daughter--wife just had her other knee replaced and is still hospitalized.\par He is not sure when he last saw his PCP.\par Has lost 4 lb in weight\par Diet reviewed:\par --Breakfast is still one slice of bread, a pear and coffee.  (Puts thickener in the coffee, ?? occasionally a teaspoon of sugar)\par --Lunch seems to be a sandwich of cooked vegetables, occasionally cheese\par --Protein at supper is chicken, fish or veal.  Starch at supper is still usually pasta, often with broccoli hira, beans, etc\par --Will often have fruit after supper.  If he has grapes, limits to 10.  Sometimes has half a tangerine\par \sarai Missed his last ophth appointment with Dr. Chou due to transportation problems.  \sarai Denies any numbness or paresthesias in his feet\par Taking levothyroxine at least 30 min before breakfast

## 2022-01-02 NOTE — PHYSICAL EXAM
[Alert] : alert [No Acute Distress] : no acute distress [Normal Sclera/Conjunctiva] : normal sclera/conjunctiva [EOMI] : extra ocular movement intact [No Proptosis] : no proptosis [No Lid Lag] : no lid lag [Normal Outer Ear/Nose] : the ears and nose were normal in appearance [No Neck Mass] : no neck mass was observed [No LAD] : no lymphadenopathy [Normal Rate and Effort] : normal respiratory rate and effort [No Murmurs] : no murmurs [Normal Rate] : heart rate was normal [Regular Rhythm] : with a regular rhythm [Carotids Normal] : carotid pulses were normal with no bruits [No Edema] : no peripheral edema [Not Tender] : non-tender [Soft] : abdomen soft [Normal Supraclavicular Nodes] : no supraclavicular lymphadenopathy [Normal Anterior Cervical Nodes] : no anterior cervical lymphadenopathy [No CVA Tenderness] : no ~M costovertebral angle tenderness [No Involuntary Movements] : no involuntary movements were seen [No Joint Swelling] : no joint swelling seen [Normal Strength/Tone] : muscle strength and tone were normal [No Rash] : no rash [No Tremors] : no tremors [Normal Sensation on Monofilament Testing] : normal sensation on monofilament testing of lower extremities [Normal Affect] : the affect was normal [Normal Mood] : the mood was normal [Clear to Auscultation] : lungs were clear to auscultation bilaterally [Normal] : normal [0] : 0 in the posterior tibialis [2+] : 2+ in the dorsalis pedis [Vibration Dec.] : diminished vibratory sensation at the level of the toes [Kyphosis] : no kyphosis present [Acanthosis Nigricans] : no acanthosis nigricans [Foot Ulcers] : no foot ulcers [Delayed in the Right Toes] : normal in the toes [Delayed in the Left Toes] : normal in the toes [Position Sense Dec.] : normal position sense at the level of the toes [#1 Diminished] : number 1 was normal [#2 Diminished] : number 2 was normal [#3 Diminished] : number 3 was normal [#4 Diminished] : number 4 was normal [#5 Diminished] : number 5 was normal [#6 Diminished] : number 6 was normal [#7 Diminished] : number 7 was normal [#8 Diminished] : number 8 was normal [#9 Diminished] : number 9 was normal [#10 Diminished] : number 10 was normal [de-identified] : Moderately overweight [de-identified] : Pupils miotic.  Moderate corneal arcus [de-identified] : Hearing seems close to normal today [de-identified] : Thyroidectomy scar.  No palpable thyroid tissue [de-identified] : Mild decrease in air entry at the left base [de-identified] : DP pulses 2+ bilaterally [de-identified] : Liver edge 3 FB below the RCM [de-identified] : Vibratory sensation absent over the toes and malleoli

## 2022-01-26 ENCOUNTER — APPOINTMENT (OUTPATIENT)
Dept: OPHTHALMOLOGY | Facility: CLINIC | Age: 87
End: 2022-01-26
Payer: MEDICARE

## 2022-01-26 ENCOUNTER — NON-APPOINTMENT (OUTPATIENT)
Age: 87
End: 2022-01-26

## 2022-01-26 PROCEDURE — 92250 FUNDUS PHOTOGRAPHY W/I&R: CPT

## 2022-01-26 PROCEDURE — 92012 INTRM OPH EXAM EST PATIENT: CPT

## 2022-07-05 NOTE — REVIEW OF SYSTEMS
[Fatigue] : fatigue [Dysphagia] : dysphagia [Neck Pain] : neck pain [Cough] : cough [SOB on Exertion] : shortness of breath on exertion [Difficulty Walking] : difficulty walking [Incontinence] : incontinence [Tremors] : tremors [Hearing Loss] : hearing loss [Decreased Appetite] : appetite not decreased [Fever] : no fever [Chills] : no chills [Dry Eyes] : no dryness [Eyes Itch] : no itch [Chest Pain] : no chest pain [Palpitations] : no palpitations [Lower Ext Edema] : no lower extremity edema [Shortness Of Breath] : no shortness of breath [Wheezing] : no wheezing [Nausea] : no nausea [Constipation] : no constipation [Heartburn] : no heartburn [Diarrhea] : no diarrhea [Polyuria] : no polyuria [Dysuria] : no dysuria [Muscle Weakness] : no muscle weakness [Myalgia] : no myalgia  [Dry Skin] : no dry skin [Hair Loss] : no hair loss [Ulcer] : no ulcer [Headaches] : no headaches [Pain/Numbness of Digits] : no pain/numbness of digits [Depression] : no depression [Anxiety] : no anxiety [Stress] : no stress [Polydipsia] : no polydipsia [Cold Intolerance] : no cold intolerance [Heat Intolerance] : no heat intolerance [Easy Bleeding] : no ~M tendency for easy bleeding [Easy Bruising] : no tendency for easy bruising [FreeTextEntry2] : Has lost 7 lb since his last visit [FreeTextEntry3] : Has impaired distance vision but refuses to wear his glasses [FreeTextEntry4] : Neck stiffness persists. [FreeTextEntry6] : Still with occasional post-prandial coughing despite using a thickener for liquids [FreeTextEntry8] : Nocturia 3-4X/night.   [FreeTextEntry9] : Severe stiffness and LOM R shoulder.  Has been told that he is a candidate for shoulder replacement.  Had a steroid injection in the shoulder last week with minimal improvement [de-identified] : Tremor is minimally improved on the Sinemet

## 2022-07-05 NOTE — ASSESSMENT
[FreeTextEntry1] : 1) Type 2 DM:  Impossible to assess his glycemic control without an updated A1c level since he does not monitor fingersticks.  He appears to be more compliant with his diet, and has lost 7 lb in weight.  \par --Diet was discussed in detail.  The main issue has always been his fruit intake.  He is now limiting his fruit at breakfast, and appears to be doing the same after supper--though I reminded him about the need to limit the number of grapes.\par --Will get an updated A1c level today\par \par 2) Hypothyroidism:  Is taking the levothyroxine consistently and correctly.  He has needed only small adjustments in his regimen during the past 2 years.\par --Continue levothyroxine 250 mcg 5 days/week, 125 mcg 2 days/week\par --TFTs today\par \par 3) Thyroid CA:  Has not had a thyroid ultrasound since October of 2020.  That study was negative for recurrent disease in the thyroid bed, and showed the left level 4 cervical lymph node (of concern due to lack of fatty hilum) to be unchanged.  HIs thyroglobulin levels have been at most only borderline detectable.\par --Repeat thyroglobulin level today\par --Repeat ultrasound at [facility]\par \par 4) Hypercholesterolemia:  Diet is fairly well restricted in terms of saturated fat intake\par --To obtain a repeat lipid profile today\par \par 5) Tremor:  I also cannot tell whether the pt likely has Parkinsons vs an essential tremor, though the treatment for the two could well be somewhat different.\par --Pt will try to see Dr. Muñoz for evaluation.\par \par See for follow-up in 6 months.\par CMP, lipids, A1c, Tg level with anti-Tg antibodies, microalbumin today\par Same studies before his next visit.  [FreeTextEntry2] : Diet

## 2022-07-05 NOTE — HISTORY OF PRESENT ILLNESS
[FreeTextEntry1] : 88-year-old man who is followed for follicular thyroid CA, post-surgical hypothyroidism, type 2 DM and hyperlipidemia.  He is s/p total thyroidectomy and I-131 ablation (150 mCi) in 2010 for a 3.5 cm lesion in the R lobe which showed capsular and perineural invasion on pathology.  There was no local extension or lymph node involvement.  HIs thyroglobulin levels have remained undetectable since treatment, including after Thyrogen stimulation in 2015.  (His total body scan was also negative at that time).  His other medical problems include chronic dysphagia (which dates to the time of his surgery) and a previous skull fracture with a subdural hematoma which required craniotomy/evacuation/repair in 1989. \par \sarai Came to the office accompanied by his wife.\sarai Has not had any acute illnesses or significant medical issues since his last visit.\sarai Has not seen his PCP--wife says "because he hasn't been sick"\par Medications reviewed--No recent changes.  Wife does not think that his tremor has improved on the Sinemet.\sarai Has had one COVID booster--did this last September..  \sarai Did not do bloodwork before the visit.\sarai Has lost 7 lb in weight--possibly from more physical activity--(works in his daughter's garden).\par Current diet:\par --Breakfast is a slice of pannetone (or a croissant) plus one pear\par --Lunch is a sandwich of grilled peppers or broccoli hira\par --Protein at supper is fish once a week (sometimes also has shrimp at the family dinner on Sunday), veal/lamb chop/steak each once a week.  Starch is pasta 2-3X/week, otherwise potato\par --Has fruit (cherries, grapes or melon) after supper\par --Intake of baked goods has been minimal\par Ophth exams--sees Dr. Chou every 6 months\sarai Denies any numbness or paresthesias in his feet\sarai Saw a neurologist regarding his tremor.  Wife says that she told them that she was not sure whether he had Parkinsons or an essential tremor, but that "the treatment would not be any different."

## 2022-07-05 NOTE — PHYSICAL EXAM
[Alert] : alert [No Acute Distress] : no acute distress [Normal Sclera/Conjunctiva] : normal sclera/conjunctiva [EOMI] : extra ocular movement intact [No Proptosis] : no proptosis [No Lid Lag] : no lid lag [Normal Outer Ear/Nose] : the ears and nose were normal in appearance [No Neck Mass] : no neck mass was observed [No LAD] : no lymphadenopathy [Normal Rate and Effort] : normal respiratory rate and effort [Clear to Auscultation] : lungs were clear to auscultation bilaterally [No Murmurs] : no murmurs [Normal Rate] : heart rate was normal [Regular Rhythm] : with a regular rhythm [Carotids Normal] : carotid pulses were normal with no bruits [No Edema] : no peripheral edema [Not Tender] : non-tender [Soft] : abdomen soft [Normal Supraclavicular Nodes] : no supraclavicular lymphadenopathy [Normal Anterior Cervical Nodes] : no anterior cervical lymphadenopathy [No CVA Tenderness] : no ~M costovertebral angle tenderness [No Joint Swelling] : no joint swelling seen [Normal Strength/Tone] : muscle strength and tone were normal [No Rash] : no rash [Normal] : normal [Vibration Dec.] : diminished vibratory sensation at the level of the toes [Normal Sensation on Monofilament Testing] : normal sensation on monofilament testing of lower extremities [Normal Affect] : the affect was normal [Normal Mood] : the mood was normal [Normal Hearing] : hearing was normal [1+] : 1+ in the dorsalis pedis [Kyphosis] : no kyphosis present [Acanthosis Nigricans] : no acanthosis nigricans [Foot Ulcers] : no foot ulcers [Delayed in the Right Toes] : normal in the toes [Delayed in the Left Toes] : normal in the toes [Position Sense Dec.] : normal position sense at the level of the toes [#1 Diminished] : number 1 was normal [#2 Diminished] : number 2 was normal [#3 Diminished] : number 3 was normal [#4 Diminished] : number 4 was normal [#5 Diminished] : number 5 was normal [#6 Diminished] : number 6 was normal [#7 Diminished] : number 7 was normal [#8 Diminished] : number 8 was normal [#9 Diminished] : number 9 was normal [#10 Diminished] : number 10 was normal [de-identified] : Moderately overweight [de-identified] : Pupils miotic.  Moderate corneal arcus [de-identified] : Thyroidectomy scar.  No palpable thyroid tissue.  Severely limited neck mobility [de-identified] : DP pulses 2+ bilaterally [de-identified] : Liver edge 3 FB below the RCM [de-identified] : Severely limited ROM of his R shoulder--can abduct only 45 degrees.   [de-identified] : Vibratory sensation severely decreased over the toes and malleoli. Moderate resting and intention tremor.  Bradykinesia.  ?? mild cogwheeling

## 2022-07-05 NOTE — ADDENDUM
[FreeTextEntry1] : Spoke with the pt's wife on 7/5 regarding the results of the bloodwork drawn at today's visit:\par --Glycemic control is adequate--Glu 140,  A1c 6.6%\par --Urine microalbumin undetectable\par --CMP essentially WNL  (alk phos borderline elevated at 123)\par --Lipids at goal--LDL 94, HDL 71, TG 89\par --TSH suppressed to 0.16 uU/ml\par To decrease the levothyroxine to 250 mcg 4 days/week, 125 mcg MWF\par --thyroglobulin level pending

## 2022-12-11 PROBLEM — R25.1 TREMOR: Status: ACTIVE | Noted: 2021-04-13

## 2022-12-14 PROBLEM — G20 PARKINSON'S DISEASE: Status: ACTIVE | Noted: 2022-01-01

## 2022-12-21 NOTE — HISTORY OF PRESENT ILLNESS
[FreeTextEntry1] : 88-year-old man who is followed for follicular thyroid CA, post-surgical hypothyroidism, type 2 DM and hyperlipidemia.  He is s/p total thyroidectomy and I-131 ablation (150 mCi) in 2010 for a 3.5 cm lesion in the R lobe which showed capsular and perineural invasion on pathology.  There was no local extension or lymph node involvement.  HIs thyroglobulin levels have remained undetectable since treatment, including after Thyrogen stimulation in 2015.  (His total body scan was also negative at that time).  His other medical problems include chronic dysphagia (which dates to the time of his surgery) and a previous skull fracture with a subdural hematoma which required craniotomy/evacuation/repair in 1989. \par \par Came to the office accompanied by his wife.\par Interim history since his last visit is significant for an episode of near-syncope in July--was dehydrated from working in the garden on a hot day.  Was seen in the ED at [facility], required a few stitches for a facial laceration, and was advised to see his cardiologist within the next week or two.  The cardiologist apparently told them that no further workup was necessary\par Thyroid ultrasound on 11/12/22 was negative for recurrent disease in the thyroid bed or pathologic adenopathy.\par Current diet:\par --Breakfast is a croissant and a pear\par --Lunch is cooked peppers in a tomato-based sauce, plus a slice of bread\par --Protein at supper is fish once a week, otherwise veal or chicken.  If there is starch at the meal, it will usually be pasta.\par --Will sometimes have fruit after supper\par --Intake of baked goods is minimal\par Saw Dr. Chou for ophthalmology follow-up last week.  Exam was negative\par Has an appointment with Dr. Muñoz regarding possible Parkinsons disease later this month.

## 2022-12-21 NOTE — DATA REVIEWED
[FreeTextEntry1] : [facility] [facility] [employer]  (11/3/22)\par   (Results received after the pt's visit)\par \par , A1c 6.4%\par CMP--Na 133, otherwise WNL\par LDL 93, HDL 59, \par Urine microalbumin undetectable\par Free T4 1.28 ng/dl, but TSH level not done.  (TSI was done by mistake)\par Thyroglobulin level < 0.2 ng/ml  (Anti-Tg Ab negative)\par \par Thyroid ultrasound ([facility] 11/12/22)--Negative for recurrent disease in the thyroid bed or for pathologic adenopathy

## 2022-12-21 NOTE — PHYSICAL EXAM
[Alert] : alert [No Acute Distress] : no acute distress [Normal Sclera/Conjunctiva] : normal sclera/conjunctiva [EOMI] : extra ocular movement intact [PERRL] : pupils equal, round and reactive to light [No Proptosis] : no proptosis [No Lid Lag] : no lid lag [Normal Outer Ear/Nose] : the ears and nose were normal in appearance [No Neck Mass] : no neck mass was observed [No LAD] : no lymphadenopathy [Normal Rate and Effort] : normal respiratory rate and effort [Clear to Auscultation] : lungs were clear to auscultation bilaterally [No Murmurs] : no murmurs [Normal Rate] : heart rate was normal [Regular Rhythm] : with a regular rhythm [Carotids Normal] : carotid pulses were normal with no bruits [No Edema] : no peripheral edema [Not Tender] : non-tender [Soft] : abdomen soft [Normal Supraclavicular Nodes] : no supraclavicular lymphadenopathy [Normal Anterior Cervical Nodes] : no anterior cervical lymphadenopathy [No CVA Tenderness] : no ~M costovertebral angle tenderness [No Joint Swelling] : no joint swelling seen [Normal Strength/Tone] : muscle strength and tone were normal [No Rash] : no rash [Normal] : normal [1+] : 1+ in the posterior tibialis [Vibration Dec.] : diminished vibratory sensation at the level of the toes [Normal Sensation on Monofilament Testing] : normal sensation on monofilament testing of lower extremities [Normal Affect] : the affect was normal [Normal Mood] : the mood was normal [2+] : 2+ in the dorsalis pedis [Kyphosis] : no kyphosis present [Acanthosis Nigricans] : no acanthosis nigricans [Foot Ulcers] : no foot ulcers [Delayed in the Right Toes] : normal in the toes [Delayed in the Left Toes] : normal in the toes [Position Sense Dec.] : normal position sense at the level of the toes [#1 Diminished] : number 1 was normal [#2 Diminished] : number 2 was normal [#3 Diminished] : number 3 was normal [#4 Diminished] : number 4 was normal [#5 Diminished] : number 5 was normal [#6 Diminished] : number 6 was normal [#7 Diminished] : number 7 was normal [#8 Diminished] : number 8 was normal [#9 Diminished] : number 9 was normal [#10 Diminished] : number 10 was normal [de-identified] : Moderately overweight [de-identified] : Moderate corneal arcus [de-identified] : Hearing acuity is moderately decreased [de-identified] : Thyroidectomy scar.  No palpable thyroid tissue.  Severely limited neck mobility [de-identified] : DP pulses 2+ bilaterally [de-identified] : Liver edge difficult to feel due to muscle tension [de-identified] : Severely limited ROM of his R shoulder--can abduct only 90 degrees.   [de-identified] : Vibratory sensation severely decreased over the toes and malleoli. Moderate resting and intention tremor.  Still markedly bradykinetic, but without definite cogwheeling

## 2022-12-21 NOTE — REVIEW OF SYSTEMS
[Fatigue] : fatigue [Dysphagia] : dysphagia [Neck Pain] : neck pain [Hearing Loss] : hearing loss [Cough] : cough [SOB on Exertion] : shortness of breath on exertion [Constipation] : constipation [Incontinence] : incontinence [Back Pain] : back pain [Difficulty Walking] : difficulty walking [Tremors] : tremors [Decreased Appetite] : appetite not decreased [Recent Weight Gain (___ Lbs)] : no recent weight gain [Recent Weight Loss (___ Lbs)] : no recent weight loss [Fever] : no fever [Chills] : no chills [Dry Eyes] : no dryness [Eyes Itch] : no itch [Chest Pain] : no chest pain [Palpitations] : no palpitations [Lower Ext Edema] : no lower extremity edema [Shortness Of Breath] : no shortness of breath [Wheezing] : no wheezing [Nausea] : no nausea [Heartburn] : no heartburn [Diarrhea] : no diarrhea [Polyuria] : no polyuria [Dysuria] : no dysuria [Muscle Weakness] : no muscle weakness [Myalgia] : no myalgia  [Dry Skin] : no dry skin [Hair Loss] : no hair loss [Ulcer] : no ulcer [Headaches] : no headaches [Pain/Numbness of Digits] : no pain/numbness of digits [Depression] : no depression [Anxiety] : no anxiety [Stress] : no stress [Polydipsia] : no polydipsia [Cold Intolerance] : no cold intolerance [Heat Intolerance] : no heat intolerance [Easy Bleeding] : no ~M tendency for easy bleeding [Easy Bruising] : no tendency for easy bruising [FreeTextEntry3] : Has impaired distance vision but refuses to wear his glasses [FreeTextEntry4] : Neck stiffness persists. [FreeTextEntry6] : Still with occasional post-prandial coughing despite using a thickener for liquids.  Sleeps with his head elevated [FreeTextEntry7] : Taking Miralax every day for constipation [FreeTextEntry8] : Nocturia 3-4X/night.   [FreeTextEntry9] : Denies any stiffness in his R shoulder--but wife says "because he doesn't do anything"  (Has been told that he is a candidate for shoulder replacement)  Had a recent epidural steroid injection with improvement which only lasted a few days [de-identified] : Tremor is minimally improved on the Sinemet.  Complains of feeling "stiff"  Has radicular pain in his R leg

## 2022-12-21 NOTE — ADDENDUM
[FreeTextEntry1] : Was finally able to obtain the pt's bloodwork from 11/3/22 and spoke to the pt's wife on 12/21/22:\par \par FBS (110 mg%) and A1c level (6.4%) were excellent\par LDL-cholesterol level was at goal (93 mg%)\par Thyroglobulin level was undetectable\par Urine microalbumin was also undetectable\par TSH level was not done (TSI seems to have been done by mistake).  Will not try to repeat this with a separate test given the pt's difficulty in getting to the lab

## 2022-12-21 NOTE — ASSESSMENT
[FreeTextEntry1] : 1) Type 2 DM:  Glycemic control is excellent as assessed by the A1c level.  The pt does not monitor fingersticks, and will defer starting this until/unless his A1c level is > 7%.  HIs diet has improved over the past several months, primarily a decrease in his fruit intake\par --Continue metformin\par --Diet was reinforced\par \par 2) Hypercholesterolemia:  LDL-cholesterol is still somewhat above his goal of 70 mg%.\par --Will increase the atorvastatin to 40 mg/day \par \par 3) Hypothyroidism:  TSH level was not done on the current labwork, but the free T4 level is lower than on the last bloodwork which had a suppressed TSH.\par --Will continue the current regimen.  It is difficult for the pt to get to the lab, but will see if he can have a TSH drawn at some point within the next month--(possibly on the day when he sees Dr. Muñoz)\par \par 4) Follicular thyroid CA:  Thyroid ultrasound is negative for recurrent disease in the thyroid bed or for pathologic adenopathy.  Thyroglobulin level is also undetectable.\par --Continue yearly ultrasounds and thyroglobulin levels.\par \par 5) Tremor:  He does not think that his tremor has improved on the Sinemet but it does seem less prominent.  Still suspect that he has Parkinsons rather than an essential tremor because of the bradykinesia and stiffness.\par --To see Dr. Muñoz for evaluation\par \par See for follow-up in 6 months.  CMP, lipids, A1c, TFTs before the visit\par  [FreeTextEntry2] : Diet

## 2023-01-01 ENCOUNTER — APPOINTMENT (OUTPATIENT)
Dept: OPHTHALMOLOGY | Facility: CLINIC | Age: 88
End: 2023-01-01

## 2023-01-01 ENCOUNTER — NON-APPOINTMENT (OUTPATIENT)
Age: 88
End: 2023-01-01

## 2023-01-01 ENCOUNTER — APPOINTMENT (OUTPATIENT)
Dept: OPHTHALMOLOGY | Facility: CLINIC | Age: 88
End: 2023-01-01
Payer: MEDICARE

## 2023-01-01 ENCOUNTER — APPOINTMENT (OUTPATIENT)
Dept: ENDOCRINOLOGY | Facility: CLINIC | Age: 88
End: 2023-01-01
Payer: MEDICARE

## 2023-01-01 ENCOUNTER — FORM ENCOUNTER (OUTPATIENT)
Age: 88
End: 2023-01-01

## 2023-01-01 ENCOUNTER — APPOINTMENT (OUTPATIENT)
Dept: NEUROLOGY | Facility: CLINIC | Age: 88
End: 2023-01-01
Payer: MEDICARE

## 2023-01-01 VITALS
BODY MASS INDEX: 30.14 KG/M2 | HEIGHT: 62.5 IN | OXYGEN SATURATION: 95 % | TEMPERATURE: 97.4 F | SYSTOLIC BLOOD PRESSURE: 156 MMHG | DIASTOLIC BLOOD PRESSURE: 80 MMHG | HEART RATE: 90 BPM | WEIGHT: 168 LBS

## 2023-01-01 VITALS
TEMPERATURE: 97.3 F | DIASTOLIC BLOOD PRESSURE: 67 MMHG | BODY MASS INDEX: 29.79 KG/M2 | HEART RATE: 91 BPM | HEIGHT: 62.5 IN | OXYGEN SATURATION: 97 % | SYSTOLIC BLOOD PRESSURE: 128 MMHG | WEIGHT: 166 LBS

## 2023-01-01 VITALS — DIASTOLIC BLOOD PRESSURE: 91 MMHG | HEART RATE: 85 BPM | SYSTOLIC BLOOD PRESSURE: 173 MMHG | OXYGEN SATURATION: 95 %

## 2023-01-01 DIAGNOSIS — E11.9 TYPE 2 DIABETES MELLITUS W/OUT COMPLICATIONS: ICD-10-CM

## 2023-01-01 DIAGNOSIS — C73 MALIGNANT NEOPLASM OF THYROID GLAND: ICD-10-CM

## 2023-01-01 DIAGNOSIS — E89.0 POSTPROCEDURAL HYPOTHYROIDISM: ICD-10-CM

## 2023-01-01 DIAGNOSIS — E78.00 PURE HYPERCHOLESTEROLEMIA, UNSPECIFIED: ICD-10-CM

## 2023-01-01 PROCEDURE — 99215 OFFICE O/P EST HI 40 MIN: CPT

## 2023-01-01 PROCEDURE — 92250 FUNDUS PHOTOGRAPHY W/I&R: CPT

## 2023-01-01 PROCEDURE — 92014 COMPRE OPH EXAM EST PT 1/>: CPT

## 2023-01-01 PROCEDURE — 99214 OFFICE O/P EST MOD 30 MIN: CPT

## 2023-01-01 RX ORDER — RIVASTIGMINE TARTRATE 1.5 MG/1
1.5 CAPSULE ORAL
Qty: 180 | Refills: 3 | Status: ACTIVE | COMMUNITY
Start: 2023-01-01 | End: 1900-01-01

## 2023-06-11 PROBLEM — C73 FOLLICULAR CARCINOMA OF THYROID: Status: ACTIVE | Noted: 2018-03-11

## 2023-06-11 PROBLEM — E89.0 POST-SURGICAL HYPOTHYROIDISM: Status: ACTIVE | Noted: 2017-10-09

## 2023-06-11 PROBLEM — E78.00 PURE HYPERCHOLESTEROLEMIA: Status: ACTIVE | Noted: 2018-03-11

## 2023-06-11 PROBLEM — E11.9 TYPE 2 DIABETES MELLITUS: Status: ACTIVE | Noted: 2018-09-12

## 2023-06-11 NOTE — HISTORY OF PRESENT ILLNESS
[FreeTextEntry1] : 88-year-old man who is followed for follicular thyroid CA, post-surgical hypothyroidism, type 2 DM and hyperlipidemia.  He is s/p total thyroidectomy and I-131 ablation (150 mCi) in 2010 for a 3.5 cm lesion in the R lobe which showed capsular and perineural invasion on pathology.  There was no local extension or lymph node involvement.  HIs thyroglobulin levels have remained undetectable since treatment, including after Thyrogen stimulation in 2015.  (His total body scan was also negative at that time).  His other medical problems include chronic dysphagia (which dates to the time of his surgery) and a previous skull fracture with a subdural hematoma which required craniotomy/evacuation/repair in 1989. \par \sarai Came to the office accompanied by his wife.\sarai Followed up with Dr. Muñoz's group regarding his Parkinsons in December of last year.  Admitted that he was not taking the increased Sinemet (i.e. 4X/day) consistently, but his wife is now supervising this.  Rivastigmine was also added to the regimen.\sarai Had another fall in April.  Was working in the garden and fell backward when trying to get up.  Had a fracture of the R wrist, and was placed in a light cast which he removed yesterday.  \sarai Started OT at home last month, and will also be starting PT later this week.  \sarai Has gained 4 lb since his last visit.\par Diet reviewed:\par --Breakfast is a croissant and a pear\par --Lunch is still cooked peppers with a tomato-based sauce, plus one slice of bread\par --Protein at supper is fish once a week, occasional a amb chop, but mostly veal or chicken. Pasta is now twice a week, has potatoes 1-2X/week.  \par --Has fruit after supper--cherries, peach, etc\par --Intake of baked goods is minimal\sarai Saw Dr. Chou for ophth follow-up last week.  Exam was basically negative\par Wife says that the additional Parkinsons meds have not made a significant difference in his symptoms.\par \sarai Ines  [phone]  (daughter)  Will speak to her about arranging bloodwork for next visit

## 2023-06-11 NOTE — REVIEW OF SYSTEMS
[Fatigue] : fatigue [Dysphagia] : dysphagia [Neck Pain] : neck pain [Hearing Loss] : hearing loss [Cough] : cough [SOB on Exertion] : shortness of breath on exertion [Constipation] : constipation [Incontinence] : incontinence [Back Pain] : back pain [Difficulty Walking] : difficulty walking [Tremors] : tremors [Poor Balance] : poor balance [Decreased Appetite] : appetite not decreased [Fever] : no fever [Chills] : no chills [Dry Eyes] : no dryness [Eyes Itch] : no itch [Chest Pain] : no chest pain [Palpitations] : no palpitations [Lower Ext Edema] : no lower extremity edema [Shortness Of Breath] : no shortness of breath [Wheezing] : no wheezing [Nausea] : no nausea [Heartburn] : no heartburn [Diarrhea] : no diarrhea [Polyuria] : no polyuria [Dysuria] : no dysuria [Muscle Weakness] : no muscle weakness [Dry Skin] : no dry skin [Myalgia] : no myalgia  [Hair Loss] : no hair loss [Ulcer] : no ulcer [Headaches] : no headaches [Pain/Numbness of Digits] : no pain/numbness of digits [Depression] : no depression [Stress] : no stress [Anxiety] : no anxiety [Polydipsia] : no polydipsia [Cold Intolerance] : no cold intolerance [Heat Intolerance] : no heat intolerance [Easy Bleeding] : no ~M tendency for easy bleeding [Easy Bruising] : no tendency for easy bruising [FreeTextEntry2] : Has gained 4 lb since his last visit [FreeTextEntry4] : Neck stiffness persists. Refuses to wear hearing aids [FreeTextEntry3] : Has impaired distance vision but refuses to wear his glasses.  Ophth exam last week was negative [FreeTextEntry6] : Still with occasional post-prandial coughing despite using a thickener for liquids.  Sleeps with his head elevated [FreeTextEntry7] : Taking Miralax every day for Sinemet-induced constipation [FreeTextEntry8] : Nocturia 3-4X/night.  (Wife says that he drinks excessive amounts of fluid at night) [FreeTextEntry9] : Still with significant LOM of his R shoulder. Had a steroid injection but without improvement.  Has been told that he is a candidate for shoulder replacement)  [de-identified] : Tremor may be somewhat improved.  Complains of feeling "stiff"  [de-identified] : Sleeps a lot during the day

## 2023-06-11 NOTE — PHYSICAL EXAM
[Alert] : alert [No Acute Distress] : no acute distress [Normal Sclera/Conjunctiva] : normal sclera/conjunctiva [EOMI] : extra ocular movement intact [No Proptosis] : no proptosis [No Lid Lag] : no lid lag [Normal Outer Ear/Nose] : the ears and nose were normal in appearance [No Neck Mass] : no neck mass was observed [No LAD] : no lymphadenopathy [Normal Rate and Effort] : normal respiratory rate and effort [No Murmurs] : no murmurs [Normal Rate] : heart rate was normal [Regular Rhythm] : with a regular rhythm [Carotids Normal] : carotid pulses were normal with no bruits [No Edema] : no peripheral edema [Not Tender] : non-tender [Soft] : abdomen soft [Normal Supraclavicular Nodes] : no supraclavicular lymphadenopathy [Normal Anterior Cervical Nodes] : no anterior cervical lymphadenopathy [No CVA Tenderness] : no ~M costovertebral angle tenderness [No Joint Swelling] : no joint swelling seen [Normal Strength/Tone] : muscle strength and tone were normal [No Rash] : no rash [Normal] : normal [1+] : 1+ in the posterior tibialis [Vibration Dec.] : diminished vibratory sensation at the level of the toes [Normal Sensation on Monofilament Testing] : normal sensation on monofilament testing of lower extremities [Normal Affect] : the affect was normal [Normal Mood] : the mood was normal [0] : 0 in the dorsalis pedis [Kyphosis] : no kyphosis present [Acanthosis Nigricans] : no acanthosis nigricans [Foot Ulcers] : no foot ulcers [Delayed in the Right Toes] : normal in the toes [Delayed in the Left Toes] : normal in the toes [Position Sense Dec.] : normal position sense at the level of the toes [#1 Diminished] : number 1 was normal [#2 Diminished] : number 2 was normal [#3 Diminished] : number 3 was normal [#4 Diminished] : number 4 was normal [#5 Diminished] : number 5 was normal [#6 Diminished] : number 6 was normal [#7 Diminished] : number 7 was normal [#8 Diminished] : number 8 was normal [#9 Diminished] : number 9 was normal [#10 Diminished] : number 10 was normal [de-identified] : Pupils miotic.  Moderate corneal arcus [de-identified] : Moderately overweight.  Extremely bradykinetic [de-identified] : Hearing acuity is moderately decreased [de-identified] : Thyroidectomy scar.  No palpable thyroid tissue.  Severely limited neck mobility [de-identified] : End-inspiratory Velcro-type crackles at both bases, L > R [de-identified] : DP pulses non-palpable bilaterally, but capillary refill is normal [de-identified] : Liver edge difficult to feel due to muscle tension [de-identified] : Vibratory sensation severely decreased over the toes and malleoli. Mild resting and intention tremor.  Still markedly bradykinetic, but without definite cogwheeling [de-identified] : Severely limited ROM of his R shoulder.  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

## 2023-07-26 ENCOUNTER — APPOINTMENT (OUTPATIENT)
Dept: NEUROLOGY | Facility: CLINIC | Age: 88
End: 2023-07-26

## 2023-09-15 ENCOUNTER — APPOINTMENT (OUTPATIENT)
Dept: NEUROLOGY | Facility: CLINIC | Age: 88
End: 2023-09-15

## 2023-12-07 ENCOUNTER — APPOINTMENT (OUTPATIENT)
Dept: ENDOCRINOLOGY | Facility: CLINIC | Age: 88
End: 2023-12-07